# Patient Record
Sex: MALE | Race: WHITE | NOT HISPANIC OR LATINO | Employment: FULL TIME | URBAN - METROPOLITAN AREA
[De-identification: names, ages, dates, MRNs, and addresses within clinical notes are randomized per-mention and may not be internally consistent; named-entity substitution may affect disease eponyms.]

---

## 2024-04-26 DIAGNOSIS — E03.9 HYPOTHYROIDISM, UNSPECIFIED TYPE: Primary | ICD-10-CM

## 2024-04-26 RX ORDER — LEVOTHYROXINE SODIUM 50 UG/1
50 TABLET ORAL DAILY
Qty: 90 TABLET | Refills: 0 | Status: SHIPPED | OUTPATIENT
Start: 2024-04-26 | End: 2025-04-26

## 2024-05-06 ENCOUNTER — APPOINTMENT (OUTPATIENT)
Dept: ENDOCRINOLOGY | Facility: CLINIC | Age: 24
End: 2024-05-06
Payer: COMMERCIAL

## 2024-09-10 DIAGNOSIS — E03.9 HYPOTHYROIDISM, UNSPECIFIED TYPE: Primary | ICD-10-CM

## 2024-11-01 DIAGNOSIS — E03.9 HYPOTHYROIDISM, UNSPECIFIED TYPE: ICD-10-CM

## 2024-11-01 RX ORDER — LEVOTHYROXINE SODIUM 50 UG/1
50 TABLET ORAL DAILY
Qty: 90 TABLET | Refills: 0 | Status: SHIPPED | OUTPATIENT
Start: 2024-11-01

## 2024-11-11 ENCOUNTER — LAB (OUTPATIENT)
Dept: LAB | Facility: LAB | Age: 24
End: 2024-11-11
Payer: COMMERCIAL

## 2024-11-11 DIAGNOSIS — E03.9 HYPOTHYROIDISM, UNSPECIFIED TYPE: ICD-10-CM

## 2024-11-11 LAB — TSH SERPL-ACNC: 1.45 MIU/L (ref 0.44–3.98)

## 2024-11-11 PROCEDURE — 84443 ASSAY THYROID STIM HORMONE: CPT

## 2024-11-11 PROCEDURE — 36415 COLL VENOUS BLD VENIPUNCTURE: CPT

## 2024-11-12 ENCOUNTER — APPOINTMENT (OUTPATIENT)
Dept: ENDOCRINOLOGY | Facility: CLINIC | Age: 24
End: 2024-11-12
Payer: COMMERCIAL

## 2024-11-12 VITALS
DIASTOLIC BLOOD PRESSURE: 60 MMHG | HEIGHT: 69 IN | BODY MASS INDEX: 27.31 KG/M2 | SYSTOLIC BLOOD PRESSURE: 122 MMHG | WEIGHT: 184.4 LBS | RESPIRATION RATE: 16 BRPM | HEART RATE: 80 BPM

## 2024-11-12 DIAGNOSIS — E03.9 HYPOTHYROIDISM, UNSPECIFIED TYPE: ICD-10-CM

## 2024-11-12 PROBLEM — R73.01 IMPAIRED FASTING GLUCOSE: Status: ACTIVE | Noted: 2024-11-12

## 2024-11-12 PROCEDURE — 1036F TOBACCO NON-USER: CPT | Performed by: INTERNAL MEDICINE

## 2024-11-12 PROCEDURE — 3008F BODY MASS INDEX DOCD: CPT | Performed by: INTERNAL MEDICINE

## 2024-11-12 PROCEDURE — 99213 OFFICE O/P EST LOW 20 MIN: CPT | Performed by: INTERNAL MEDICINE

## 2024-11-12 RX ORDER — LEVOTHYROXINE SODIUM 50 UG/1
50 TABLET ORAL DAILY
Qty: 90 TABLET | Refills: 3 | Status: SHIPPED | OUTPATIENT
Start: 2024-11-12

## 2024-11-12 ASSESSMENT — ENCOUNTER SYMPTOMS
SHORTNESS OF BREATH: 0
DIZZINESS: 0
NAUSEA: 0
LIGHT-HEADEDNESS: 0
VOMITING: 0
DIARRHEA: 0
CHILLS: 0
FEVER: 0

## 2024-11-12 NOTE — PROGRESS NOTES
"Endocrinology: Follow up visit  Subjective   Patient ID: Lee Bloom \"Smooth" is a 24 y.o. male who presents for Hypothyroidism.    PCP: SAVAGE Swift  Last seen 2 yrs ago.  Taking t4 since but no follow up   Feeling fine no complaints      Review of Systems   Constitutional:  Negative for chills and fever.   Respiratory:  Negative for shortness of breath.    Gastrointestinal:  Negative for diarrhea, nausea and vomiting.   Endocrine: Negative for cold intolerance and heat intolerance.   Neurological:  Negative for dizziness and light-headedness.       Patient Active Problem List   Diagnosis    Hypothyroidism    Impaired fasting glucose        Home Meds:  Current Outpatient Medications   Medication Instructions    levothyroxine (SYNTHROID, LEVOXYL) 50 mcg, oral, Daily        No Known Allergies     Objective   Vitals:    11/12/24 0826   BP: 122/60   Pulse: 80   Resp: 16      Vitals:    11/12/24 0826   Weight: 83.6 kg (184 lb 6.4 oz)      Body mass index is 27.23 kg/m².   Physical Exam  Constitutional:       Appearance: Normal appearance. He is overweight.   HENT:      Head: Normocephalic and atraumatic.   Neck:      Thyroid: No thyroid mass, thyromegaly or thyroid tenderness.   Cardiovascular:      Rate and Rhythm: Normal rate and regular rhythm.      Heart sounds: No murmur heard.     No gallop.   Pulmonary:      Effort: Pulmonary effort is normal.      Breath sounds: Normal breath sounds.   Abdominal:      Palpations: Abdomen is soft.      Comments: benign   Neurological:      General: No focal deficit present.      Mental Status: He is alert and oriented to person, place, and time.      Deep Tendon Reflexes: Reflexes are normal and symmetric.   Psychiatric:         Behavior: Behavior is cooperative.         Labs:  Lab Results   Component Value Date    TSH 1.45 11/11/2024    FREET4 1.09 04/04/2022      Lab Results   Component Value Date    THYROIDPAB 23 08/21/2019        Assessment/Plan "   Assessment & Plan  Hypothyroidism, unspecified type    Orders:    levothyroxine (Synthroid, Levoxyl) 50 mcg tablet; Take 1 tablet (50 mcg) by mouth once daily.    Labs reviewed: levels normal on current med dose  Follow up in one year    Electronically signed by:  Mer Pierce MD 11/12/24 8:27 AM

## 2025-01-13 ENCOUNTER — OFFICE VISIT (OUTPATIENT)
Dept: URGENT CARE | Age: 25
End: 2025-01-13
Payer: COMMERCIAL

## 2025-01-13 VITALS
HEART RATE: 70 BPM | TEMPERATURE: 96.4 F | SYSTOLIC BLOOD PRESSURE: 137 MMHG | RESPIRATION RATE: 18 BRPM | DIASTOLIC BLOOD PRESSURE: 66 MMHG | OXYGEN SATURATION: 98 %

## 2025-01-13 DIAGNOSIS — H61.23 BILATERAL IMPACTED CERUMEN: Primary | ICD-10-CM

## 2025-01-13 DIAGNOSIS — H93.8X3 SENSATION OF FULLNESS IN BOTH EARS: ICD-10-CM

## 2025-01-13 PROCEDURE — 99203 OFFICE O/P NEW LOW 30 MIN: CPT | Performed by: PHYSICIAN ASSISTANT

## 2025-01-13 ASSESSMENT — ENCOUNTER SYMPTOMS
SINUS PAIN: 0
SINUS PRESSURE: 0

## 2025-01-13 NOTE — PROGRESS NOTES
"Subjective   Patient ID: Lee Bloom \"Raulito\" is a 24 y.o. male. They present today with a chief complaint of Ear Fullness (All winter . ). Reports this is a chronic issues and has to get ears cleaned at least once a year. No injury or trauma. No Pain.     Past Medical History  Allergies as of 01/13/2025    (No Known Allergies)       (Not in a hospital admission)       No past medical history on file.    Past Surgical History:   Procedure Laterality Date    OTHER SURGICAL HISTORY  03/04/2020    Hernia repair        reports that he has never smoked. He has never used smokeless tobacco.    Review of Systems  Review of Systems   HENT:  Negative for sinus pressure and sinus pain.         Bilateral ear fullness.                                   Objective    Vitals:    01/13/25 1422   BP: 137/66   Pulse: 70   Resp: 18   Temp: 35.8 °C (96.4 °F)   SpO2: 98%     No LMP for male patient.    Physical Exam  Vitals and nursing note reviewed.   Constitutional:       Appearance: Normal appearance.   HENT:      Head:      Comments: Bilateral cerumen impaction. No evidence of otitis externa  Eyes:      Conjunctiva/sclera: Conjunctivae normal.   Cardiovascular:      Rate and Rhythm: Normal rate.   Pulmonary:      Effort: Pulmonary effort is normal.   Neurological:      Mental Status: He is alert.         Procedures    Point of Care Test & Imaging Results from this visit  No results found for this visit on 01/13/25.   No results found.    Diagnostic study results (if any) were reviewed by Josh Gonzalez PA-C.    Assessment/Plan   Allergies, medications, history, and pertinent labs/EKGs/Imaging reviewed by Josh Gonzalez PA-C.     Medical Decision Making  Cerumen impaction. Ear irrigation bilateral.   No evidence of perforation.     Orders and Diagnoses  Diagnoses and all orders for this visit:  Bilateral impacted cerumen  -     Ear cerumen removal; Future  Sensation of fullness in both ears  -     Ear cerumen removal; " Future      Medical Admin Record      Patient disposition: Home    Electronically signed by Josh Gonzalez PA-C  2:27 PM